# Patient Record
Sex: MALE | Race: WHITE | HISPANIC OR LATINO | Employment: FULL TIME | URBAN - METROPOLITAN AREA
[De-identification: names, ages, dates, MRNs, and addresses within clinical notes are randomized per-mention and may not be internally consistent; named-entity substitution may affect disease eponyms.]

---

## 2020-04-16 ENCOUNTER — HOSPITAL ENCOUNTER (EMERGENCY)
Facility: MEDICAL CENTER | Age: 20
End: 2020-04-16
Attending: EMERGENCY MEDICINE
Payer: MEDICAID

## 2020-04-16 VITALS
WEIGHT: 137.57 LBS | RESPIRATION RATE: 16 BRPM | BODY MASS INDEX: 22.92 KG/M2 | HEIGHT: 65 IN | DIASTOLIC BLOOD PRESSURE: 83 MMHG | HEART RATE: 70 BPM | OXYGEN SATURATION: 99 % | TEMPERATURE: 97.9 F | SYSTOLIC BLOOD PRESSURE: 125 MMHG

## 2020-04-16 DIAGNOSIS — R11.2 NON-INTRACTABLE VOMITING WITH NAUSEA, UNSPECIFIED VOMITING TYPE: ICD-10-CM

## 2020-04-16 PROCEDURE — 99284 EMERGENCY DEPT VISIT MOD MDM: CPT

## 2020-04-16 PROCEDURE — 96374 THER/PROPH/DIAG INJ IV PUSH: CPT

## 2020-04-16 PROCEDURE — 700111 HCHG RX REV CODE 636 W/ 250 OVERRIDE (IP): Performed by: EMERGENCY MEDICINE

## 2020-04-16 PROCEDURE — 700105 HCHG RX REV CODE 258: Performed by: EMERGENCY MEDICINE

## 2020-04-16 PROCEDURE — 96375 TX/PRO/DX INJ NEW DRUG ADDON: CPT

## 2020-04-16 RX ORDER — ONDANSETRON 4 MG/1
4 TABLET, ORALLY DISINTEGRATING ORAL ONCE
Status: COMPLETED | OUTPATIENT
Start: 2020-04-16 | End: 2020-04-16

## 2020-04-16 RX ORDER — PROMETHAZINE HYDROCHLORIDE 25 MG/1
25 TABLET ORAL EVERY 6 HOURS PRN
Qty: 30 TAB | Refills: 0 | Status: SHIPPED | OUTPATIENT
Start: 2020-04-16

## 2020-04-16 RX ORDER — DIPHENHYDRAMINE HYDROCHLORIDE 50 MG/ML
25 INJECTION INTRAMUSCULAR; INTRAVENOUS ONCE
Status: COMPLETED | OUTPATIENT
Start: 2020-04-16 | End: 2020-04-16

## 2020-04-16 RX ORDER — ONDANSETRON 4 MG/1
4 TABLET, ORALLY DISINTEGRATING ORAL EVERY 8 HOURS PRN
Qty: 10 TAB | Refills: 0 | Status: SHIPPED | OUTPATIENT
Start: 2020-04-16

## 2020-04-16 RX ORDER — ONDANSETRON 2 MG/ML
4 INJECTION INTRAMUSCULAR; INTRAVENOUS ONCE
Status: COMPLETED | OUTPATIENT
Start: 2020-04-16 | End: 2020-04-16

## 2020-04-16 RX ORDER — SODIUM CHLORIDE, SODIUM LACTATE, POTASSIUM CHLORIDE, CALCIUM CHLORIDE 600; 310; 30; 20 MG/100ML; MG/100ML; MG/100ML; MG/100ML
1000 INJECTION, SOLUTION INTRAVENOUS ONCE
Status: COMPLETED | OUTPATIENT
Start: 2020-04-16 | End: 2020-04-16

## 2020-04-16 RX ORDER — ACETAMINOPHEN 325 MG/1
650 TABLET ORAL ONCE
Status: DISCONTINUED | OUTPATIENT
Start: 2020-04-16 | End: 2020-04-16 | Stop reason: HOSPADM

## 2020-04-16 RX ORDER — PROCHLORPERAZINE EDISYLATE 5 MG/ML
10 INJECTION INTRAMUSCULAR; INTRAVENOUS ONCE
Status: COMPLETED | OUTPATIENT
Start: 2020-04-16 | End: 2020-04-16

## 2020-04-16 RX ADMIN — ONDANSETRON 4 MG: 2 INJECTION INTRAMUSCULAR; INTRAVENOUS at 06:25

## 2020-04-16 RX ADMIN — DIPHENHYDRAMINE HYDROCHLORIDE 25 MG: 50 INJECTION INTRAMUSCULAR; INTRAVENOUS at 07:01

## 2020-04-16 RX ADMIN — ONDANSETRON 4 MG: 4 TABLET, ORALLY DISINTEGRATING ORAL at 05:42

## 2020-04-16 RX ADMIN — PROCHLORPERAZINE EDISYLATE 10 MG: 5 INJECTION INTRAMUSCULAR; INTRAVENOUS at 07:01

## 2020-04-16 RX ADMIN — ONDANSETRON 4 MG: 4 TABLET, ORALLY DISINTEGRATING ORAL at 05:54

## 2020-04-16 RX ADMIN — SODIUM CHLORIDE, POTASSIUM CHLORIDE, SODIUM LACTATE AND CALCIUM CHLORIDE 1000 ML: 600; 310; 30; 20 INJECTION, SOLUTION INTRAVENOUS at 06:25

## 2020-04-16 ASSESSMENT — LIFESTYLE VARIABLES: DO YOU DRINK ALCOHOL: NO

## 2020-04-16 NOTE — ED TRIAGE NOTES
"Chief Complaint   Patient presents with   • Nausea/Vomiting/Diarrhea     since 0400   • Chills        Pt presents to ed after reporting n/v/d since 0400. Pt reports associated chills. He denies any travel or contact with anyone who has been ill.  Pt did not have any changes to his typical diet last night.  Pt denies any other household members feeling ill.    Pt placed back in lobby.  Informed of triage process and wait times, thanked for patience.  Pt instructed to notify staff of any symptom changes.    /87   Pulse 97   Temp 36.6 °C (97.9 °F) (Temporal)   Resp 16   Ht 1.651 m (5' 5\")   Wt 62.4 kg (137 lb 9.1 oz)   SpO2 97%   BMI 22.89 kg/m²    "

## 2020-04-16 NOTE — ED PROVIDER NOTES
"ED Provider Note    CHIEF COMPLAINT  Chief Complaint   Patient presents with   • Nausea/Vomiting/Diarrhea     since 0400   • Chills       HPI  Diego Cleary is a 19 y.o. male who presents with nausea vomiting and diarrhea.  Patient was completely well yesterday.  Woke up at 3 AM felt nauseous.  He went to the restroom.  Since then he has been vomiting about every 20 minutes nonbloody nonbilious emesis.  He has had multiple episodes of watery diarrhea.  He denies any significant abdominal pain but says he has a headache.  He feels chilled all over and cold.  No fevers.  No chest pain or shortness of breath.  No known ill contacts.  No abnormal foods.  No travel out of the country.    REVIEW OF SYSTEMS  Pertinent negative: As above    PAST MEDICAL HISTORY  Negative    SOCIAL HISTORY  Social History     Tobacco Use   • Smoking status: Current Some Day Smoker   • Smokeless tobacco: Never Used   Substance Use Topics   • Alcohol use: Yes     Comment: occ   • Drug use: Not Currently       SURGICAL HISTORY  History reviewed. No pertinent surgical history.    ALLERGIES  Allergies no known allergies    PHYSICAL EXAM  VITAL SIGNS: /87   Pulse 97   Temp 36.6 °C (97.9 °F) (Temporal)   Resp 16   Ht 1.651 m (5' 5\")   Wt 62.4 kg (137 lb 9.1 oz)   SpO2 97%   BMI 22.89 kg/m²    Constitutional: Awake and alert.  Holding an emesis bag with a small amount of clear vomit  HENT: Moist mucous membranes  Eyes: Grossly normal  Neck: Normal range of motion  Cardiovascular: Normal heart rate   Thorax & Lungs: No respiratory distress  Abdomen: Nontender, soft, nondistended  Skin:  No pathologic rash.   Extremities: Well perfused  Psychiatric: Anxious appearing          COURSE & MEDICAL DECISION MAKING  Patient presents with nausea vomiting.  He was given IV fluids because of vomiting and possible dehydration.  Oral fluids were not tolerated.  He was improved on recheck.  He was given Zofran IV.  Ultimately given " Compazine and Benadryl.  His symptoms are much improved.  Abdomen examined several times and he has no focal tenderness.  He tolerated an oral challenge.  He will be discharged with prescriptions for Zofran and Phenergan.  Precautions were given for him to return to the ER for any persistent abdominal pain, fevers, uncontrolled vomiting or concern.    FINAL IMPRESSION  1.  Vomiting and diarrhea      Disposition: home in good condition      This dictation was created using voice recognition software. The accuracy of the dictation is limited to the abilities of the software.  The nursing notes were reviewed and certain aspects of this information were incorporated into this note.      Electronically signed by: Edmundo Garnica M.D., 4/16/2020 5:38 AM

## 2020-04-16 NOTE — ED NOTES
Pt was given first zofran ODT and then had an episode of emesis approx 10minutes following. Notified ERP and patient was given another zofran ODT.

## 2020-04-16 NOTE — ED NOTES
Patient had episode of emesis following second zofran ODT. Notified ERP. Line placed and patent medicated per MD order. IVF infusing.

## 2020-04-16 NOTE — ED NOTES
Patient seen by ERP at bedside and medicated at this time per order. Given zofran and will follow up with tylenol as ordered.

## 2020-05-21 ENCOUNTER — HOSPITAL ENCOUNTER (OUTPATIENT)
Facility: MEDICAL CENTER | Age: 20
End: 2020-05-21
Payer: COMMERCIAL

## 2020-06-10 LAB
SARS-COV-2 RNA SPEC QL NAA+PROBE: NOT DETECTED
SPECIMEN SOURCE: NORMAL

## 2023-09-17 ENCOUNTER — OFFICE VISIT (OUTPATIENT)
Dept: URGENT CARE | Facility: CLINIC | Age: 23
End: 2023-09-17

## 2023-09-17 VITALS
SYSTOLIC BLOOD PRESSURE: 138 MMHG | OXYGEN SATURATION: 100 % | WEIGHT: 126.4 LBS | TEMPERATURE: 97.6 F | HEIGHT: 67 IN | DIASTOLIC BLOOD PRESSURE: 76 MMHG | HEART RATE: 72 BPM | RESPIRATION RATE: 16 BRPM | BODY MASS INDEX: 19.84 KG/M2

## 2023-09-17 DIAGNOSIS — R11.2 NAUSEA AND VOMITING, UNSPECIFIED VOMITING TYPE: ICD-10-CM

## 2023-09-17 DIAGNOSIS — R19.7 BLOODY DIARRHEA: ICD-10-CM

## 2023-09-17 PROCEDURE — 3078F DIAST BP <80 MM HG: CPT | Performed by: NURSE PRACTITIONER

## 2023-09-17 PROCEDURE — 3075F SYST BP GE 130 - 139MM HG: CPT | Performed by: NURSE PRACTITIONER

## 2023-09-17 PROCEDURE — 99204 OFFICE O/P NEW MOD 45 MIN: CPT | Performed by: NURSE PRACTITIONER

## 2023-09-17 RX ORDER — METRONIDAZOLE 500 MG/1
500 TABLET ORAL 2 TIMES DAILY
Qty: 10 TABLET | Refills: 0 | Status: SHIPPED | OUTPATIENT
Start: 2023-09-17 | End: 2023-09-22

## 2023-09-17 RX ORDER — CIPROFLOXACIN 500 MG/1
500 TABLET, FILM COATED ORAL 2 TIMES DAILY
Qty: 10 TABLET | Refills: 0 | Status: SHIPPED | OUTPATIENT
Start: 2023-09-17 | End: 2023-09-22

## 2023-09-17 RX ORDER — ONDANSETRON 4 MG/1
4 TABLET, ORALLY DISINTEGRATING ORAL EVERY 8 HOURS PRN
Qty: 10 TABLET | Refills: 0 | Status: SHIPPED | OUTPATIENT
Start: 2023-09-17

## 2023-09-17 ASSESSMENT — ENCOUNTER SYMPTOMS
FEVER: 0
FATIGUE: 1
BLOOD IN STOOL: 1
ROS GI COMMENTS: 1
CHILLS: 0
ABDOMINAL PAIN: 1
CHANGE IN BOWEL HABIT: 1
DIARRHEA: 1
CONSTIPATION: 0
VOMITING: 1
SORE THROAT: 0
NAUSEA: 1

## 2023-09-17 NOTE — PROGRESS NOTES
"Subjective:   Diego Cleary is a 23 y.o. male who presents for Other (He thinks he ate something bad  3 days ago and been feeling fatigue and has been vomiting)      GI Problem  This is a new problem. Episode onset: 3 days; suspsects food intake. The problem occurs constantly. The problem has been gradually worsening. Associated symptoms include abdominal pain, a change in bowel habit, fatigue, nausea and vomiting. Pertinent negatives include no chills, fever, rash or sore throat. The symptoms are aggravated by eating. He has tried drinking for the symptoms.         Review of Systems   Constitutional:  Positive for fatigue. Negative for chills and fever.   HENT:  Negative for sore throat.    Gastrointestinal:  Positive for abdominal pain, blood in stool, change in bowel habit, diarrhea, nausea and vomiting. Negative for constipation and melena.        1   Skin:  Negative for rash.       Medications:    ciprofloxacin Tabs  metroNIDAZOLE Tabs  ondansetron Tbdp  promethazine Tabs    Allergies: Patient has no known allergies.    Problem List: Diego Cleary does not have a problem list on file.    Surgical History:  No past surgical history on file.    Past Social Hx: Diego Cleary  reports that he has been smoking. He has never used smokeless tobacco. He reports current alcohol use. He reports that he does not currently use drugs.     Past Family Hx:  Diego Cleary family history is not on file.     Problem list, medications, and allergies reviewed by myself today in Epic.     Objective:     /76 (BP Location: Right arm, Patient Position: Sitting, BP Cuff Size: Adult long)   Pulse 72   Temp 36.4 °C (97.6 °F) (Temporal)   Resp 16   Ht 1.702 m (5' 7\")   Wt 57.3 kg (126 lb 6.4 oz)   SpO2 100%   BMI 19.80 kg/m²     Physical Exam  Vitals and nursing note reviewed.   Constitutional:       General: He is not in acute distress.     Appearance: He is well-developed. " He is ill-appearing.   HENT:      Head: Normocephalic and atraumatic.      Right Ear: External ear normal.      Left Ear: External ear normal.      Nose: Nose normal.      Mouth/Throat:      Mouth: Mucous membranes are moist.   Eyes:      Conjunctiva/sclera: Conjunctivae normal.   Cardiovascular:      Rate and Rhythm: Normal rate.   Pulmonary:      Effort: Pulmonary effort is normal. No respiratory distress.      Breath sounds: Normal breath sounds.   Abdominal:      General: There is no distension.      Tenderness: There is generalized abdominal tenderness. There is no right CVA tenderness, left CVA tenderness, guarding or rebound. Negative signs include Garcia's sign, Rovsing's sign, McBurney's sign, psoas sign and obturator sign.   Musculoskeletal:         General: Normal range of motion.   Skin:     General: Skin is warm and dry.   Neurological:      General: No focal deficit present.      Mental Status: He is alert and oriented to person, place, and time. Mental status is at baseline.      Gait: Gait (gait at baseline) normal.   Psychiatric:         Judgment: Judgment normal.         Assessment/Plan:     Diagnosis and associated orders:     1. Bloody diarrhea  metroNIDAZOLE (FLAGYL) 500 MG Tab    ciprofloxacin (CIPRO) 500 MG Tab      2. Nausea and vomiting, unspecified vomiting type  ondansetron (ZOFRAN ODT) 4 MG TABLET DISPERSIBLE         Comments/MDM:     I personally reviewed prior external notes and prior test results pertinent to today's visit.  Patient having recent symptoms  Discussed management options, risks and benefits, and alternatives to treatment plan agreed upon.   Red flags discussed and indications to immediately call 911 or present to the Emergency Department.   Supportive care, differential diagnoses, and indications for immediate follow-up discussed with patient.    Patient expresses understanding and agrees to plan. Patient denies any other questions or concerns.                Please note  that this dictation was created using voice recognition software. I have made a reasonable attempt to correct obvious errors, but I expect that there are errors of grammar and possibly content that I did not discover before finalizing the note.    This note was electronically signed by Derek LANDIS.

## 2023-09-17 NOTE — LETTER
September 17, 2023         Patient: Diego Cleary   YOB: 2000   Date of Visit: 9/17/2023           To Whom it May Concern:    Diego Cleary was seen in my clinic on 9/17/2023. He may return to work on 9/18/23.    If you have any questions or concerns, please don't hesitate to call.        Sincerely,           PANDA Nichole.  Electronically Signed

## 2023-09-19 ENCOUNTER — OFFICE VISIT (OUTPATIENT)
Dept: OCCUPATIONAL MEDICINE | Facility: CLINIC | Age: 23
End: 2023-09-19

## 2023-09-19 DIAGNOSIS — Z02.4 ENCOUNTER FOR COMMERCIAL DRIVER MEDICAL EXAMINATION (CDME): ICD-10-CM

## 2023-09-19 PROCEDURE — 7100 PR DOT PHYSICAL: Performed by: PREVENTIVE MEDICINE

## 2023-10-13 PROCEDURE — RXMED WILLOW AMBULATORY MEDICATION CHARGE: Performed by: INTERNAL MEDICINE

## 2023-10-17 ENCOUNTER — PHARMACY VISIT (OUTPATIENT)
Dept: PHARMACY | Facility: MEDICAL CENTER | Age: 23
End: 2023-10-17
Payer: COMMERCIAL

## 2024-12-15 ENCOUNTER — OFFICE VISIT (OUTPATIENT)
Dept: URGENT CARE | Facility: CLINIC | Age: 24
End: 2024-12-15

## 2024-12-15 ENCOUNTER — PHARMACY VISIT (OUTPATIENT)
Dept: PHARMACY | Facility: MEDICAL CENTER | Age: 24
End: 2024-12-15
Payer: COMMERCIAL

## 2024-12-15 VITALS
HEIGHT: 67 IN | BODY MASS INDEX: 19.93 KG/M2 | WEIGHT: 127 LBS | HEART RATE: 96 BPM | TEMPERATURE: 97.8 F | DIASTOLIC BLOOD PRESSURE: 70 MMHG | RESPIRATION RATE: 12 BRPM | SYSTOLIC BLOOD PRESSURE: 138 MMHG | OXYGEN SATURATION: 100 %

## 2024-12-15 DIAGNOSIS — R11.2 NAUSEA AND VOMITING, UNSPECIFIED VOMITING TYPE: ICD-10-CM

## 2024-12-15 PROCEDURE — RXMED WILLOW AMBULATORY MEDICATION CHARGE

## 2024-12-15 PROCEDURE — 99213 OFFICE O/P EST LOW 20 MIN: CPT

## 2024-12-15 RX ORDER — ONDANSETRON 4 MG/1
TABLET, FILM COATED ORAL
Qty: 30 TABLET | Refills: 0 | OUTPATIENT
Start: 2024-12-15

## 2024-12-15 RX ORDER — ONDANSETRON 4 MG/1
4 TABLET, ORALLY DISINTEGRATING ORAL ONCE
Status: COMPLETED | OUTPATIENT
Start: 2024-12-15 | End: 2024-12-15

## 2024-12-15 RX ORDER — ONDANSETRON 4 MG/1
4 TABLET, FILM COATED ORAL EVERY 6 HOURS PRN
Qty: 30 TABLET | Refills: 0 | Status: SHIPPED | OUTPATIENT
Start: 2024-12-15 | End: 2024-12-22

## 2024-12-15 RX ADMIN — ONDANSETRON 4 MG: 4 TABLET, ORALLY DISINTEGRATING ORAL at 20:59

## 2024-12-15 ASSESSMENT — ENCOUNTER SYMPTOMS
DOUBLE VISION: 0
HEARTBURN: 0
SHORTNESS OF BREATH: 0
DIZZINESS: 0
COUGH: 0
CHILLS: 0
FEVER: 0
VOMITING: 1
BLOOD IN STOOL: 0
PALPITATIONS: 0
WEAKNESS: 0
BLURRED VISION: 0
SORE THROAT: 0
DIARRHEA: 0
ABDOMINAL PAIN: 1
TINGLING: 0
HEADACHES: 0
NAUSEA: 1
MYALGIAS: 0
CONSTIPATION: 0

## 2024-12-16 NOTE — PROGRESS NOTES
Subjective:   Diego Cleary is a 24 y.o. male who presents for Vomiting (Patient is here today for vomiting, abdominal pain and cramping. Patient states symptoms started this morning. Patient states symptoms have gotten worse since this morning. )    Patient presents to the clinic for complaints of vomiting and abdominal cramping pain since this morning.  Patient states that he had a [employer] chicken sandwich last night and woke up with the symptoms that he has now.  Patient has vomited more than 5 times but less than 10  per patient report.  Patient has not had a bowel movement yet today and has not been able to tolerate oral fluid or solid intake due to his nausea and vomiting.  Has taken Pepto-Bismol for his symptoms, but has not been able to hold it down.  Patient denies fever, chills, diarrhea, blood in his vomit, body aches, dizziness/lightheadedness.    Vomiting  Associated symptoms include abdominal pain, nausea and vomiting. Pertinent negatives include no chest pain, chills, congestion, coughing, fever, headaches, myalgias, sore throat or weakness.       Review of Systems   Constitutional:  Positive for malaise/fatigue. Negative for chills and fever.   HENT:  Negative for congestion, ear pain and sore throat.    Eyes:  Negative for blurred vision and double vision.   Respiratory:  Negative for cough and shortness of breath.    Cardiovascular:  Negative for chest pain and palpitations.   Gastrointestinal:  Positive for abdominal pain, nausea and vomiting. Negative for blood in stool, constipation, diarrhea, heartburn and melena.   Genitourinary:  Negative for dysuria.   Musculoskeletal:  Negative for myalgias.   Neurological:  Negative for dizziness, tingling, weakness and headaches.   All other systems reviewed and are negative.    Refer to HPI for additional details.    During this visit, appropriate PPE was worn, and hand hygiene was performed.    PMH:  has no past medical history on  "file.    MEDS:   Current Outpatient Medications:     bismuth subsalicylate (PEPTO-BISMOL) 262 MG/15ML Suspension, Take 30 mL by mouth every 6 hours as needed., Disp: , Rfl:     ondansetron (ZOFRAN) 4 MG Tab tablet, Take 1 Tablet by mouth every 6 hours as needed for Nausea/Vomiting for up to 7 days., Disp: 30 Tablet, Rfl: 0    influenza vaccine quad (FLUZONE QUADRIVALENT) 0.5 ML Suspension Prefilled Syringe injection, Inject 0.5 mL into the shoulder, thigh, or buttocks. (Patient not taking: Reported on 12/15/2024), Disp: 0.5 mL, Rfl: 0    ondansetron (ZOFRAN ODT) 4 MG TABLET DISPERSIBLE, Take 1 Tablet by mouth every 8 hours as needed for Nausea/Vomiting. (Patient not taking: Reported on 12/15/2024), Disp: 10 Tablet, Rfl: 0    ondansetron (ZOFRAN ODT) 4 MG TABLET DISPERSIBLE, Take 1 Tab by mouth every 8 hours as needed. (Patient not taking: Reported on 12/15/2024), Disp: 10 Tab, Rfl: 0    promethazine (PHENERGAN) 25 MG Tab, Take 1 Tab by mouth every 6 hours as needed for Nausea/Vomiting. (Patient not taking: Reported on 12/15/2024), Disp: 30 Tab, Rfl: 0    ALLERGIES: No Known Allergies  SURGHX: History reviewed. No pertinent surgical history.  SOCHX:  reports that he has been smoking. He has never used smokeless tobacco. He reports current alcohol use. He reports that he does not currently use drugs.    FH: Per HPI as applicable/pertinent.    Medications, Allergies, and current problem list reviewed today in Epic.     Objective:     /70   Pulse 96   Temp 36.6 °C (97.8 °F) (Temporal)   Resp 12   Ht 1.702 m (5' 7\")   Wt 57.6 kg (127 lb)   SpO2 100%     Physical Exam  Constitutional:       Appearance: He is ill-appearing. He is not toxic-appearing.   HENT:      Head: Normocephalic.      Right Ear: External ear normal.      Left Ear: External ear normal.      Nose: Nose normal. No congestion or rhinorrhea.      Mouth/Throat:      Mouth: Mucous membranes are moist.      Pharynx: Oropharynx is clear. No " oropharyngeal exudate or posterior oropharyngeal erythema.   Eyes:      General:         Right eye: No discharge.         Left eye: No discharge.      Extraocular Movements: Extraocular movements intact.      Conjunctiva/sclera: Conjunctivae normal.      Pupils: Pupils are equal, round, and reactive to light.   Cardiovascular:      Rate and Rhythm: Normal rate and regular rhythm.      Pulses: Normal pulses.      Heart sounds: Normal heart sounds. No murmur heard.  Pulmonary:      Effort: Pulmonary effort is normal. No respiratory distress.      Breath sounds: Normal breath sounds. No wheezing or rhonchi.   Abdominal:      General: Abdomen is flat. Bowel sounds are normal. There is no distension.      Palpations: Abdomen is soft. There is no mass.      Tenderness: There is abdominal tenderness. There is no right CVA tenderness, left CVA tenderness, guarding or rebound.      Hernia: No hernia is present.      Comments: mild to moderate tenderness to the left upper quadrant   Musculoskeletal:         General: No signs of injury. Normal range of motion.      Cervical back: Normal range of motion. No rigidity.   Lymphadenopathy:      Cervical: No cervical adenopathy.   Skin:     General: Skin is warm and dry.   Neurological:      General: No focal deficit present.      Mental Status: He is alert and oriented to person, place, and time.      Motor: No weakness.         Assessment/Plan:     Diagnosis and associated orders:     1. Nausea and vomiting, unspecified vomiting type  - ondansetron (ZOFRAN) 4 MG Tab tablet; Take 1 Tablet by mouth every 6 hours as needed for Nausea/Vomiting for up to 7 days.  Dispense: 30 Tablet; Refill: 0  - ondansetron (Zofran ODT) dispertab 4 mg    Other orders  - bismuth subsalicylate (PEPTO-BISMOL) 262 MG/15ML Suspension; Take 30 mL by mouth every 6 hours as needed.     Comments/MDM:     Discussed with patient that likely etiology of his symptoms is gastritis secondary to viral infection.   Discussed with patient that this is a self-limiting illness and that symptomatic management is the treatment.  Zofran given in clinic, Zofran prescription placed for patient as needed.  Discussed with patient that remaining hydrated and with proper food intake is of utmost importance throughout him going through this illness.  Advised patient to increase fluid intake, electrolyte intake, and to have plenty of rest.  Advised patient to continue OTC pharmacologic treatment of his symptoms, including Pepto-Bismol as needed.  Patient agreeable to this plan of care.  All patient questions answered.  Patient was monitored in the clinic for roughly 20 minutes after Zofran administration to see if the patient would be able to tolerate oral intake without nausea and vomiting.  During this waiting period, patient was able to tolerate 300 mL of water intake as well as 200 mL of apple juice intake.  RTC if symptoms persist and/or worsen.  Red flag symptoms warranting emergency medical services discussed, including but not limited to fever greater than 103F, chest pain, shortness of breath, intractable abdominal pain, inability to tolerate oral fluid and food intake, rapid heart rate, palpitations, moderate to severe weakness, or dizziness/lightheadedness/loss of consciousness.         Differential diagnosis, natural history, supportive care, and indications for immediate follow-up discussed.    Advised the patient to follow-up with the primary care physician for recheck, reevaluation, and consideration of further management.    Instructed patient to seek emergency medical attention via calling EMS or going to the Emergency Room for red flag symptoms, including but not limited to: chest pain, palpitations, fever greater than 103F, shortness of breath, wheezing, new or worsened numbness/tingling, focal or unilateral weakness, and bloody vomit/stool.     Please note that this dictation was created using voice recognition software.  I have made a reasonable attempt to correct obvious errors, but I expect that there are errors of grammar and possibly content that I did not discover before finalizing the note.    This note was electronically signed by JANN Leblanc